# Patient Record
Sex: MALE | Race: WHITE | NOT HISPANIC OR LATINO | Employment: OTHER | URBAN - METROPOLITAN AREA
[De-identification: names, ages, dates, MRNs, and addresses within clinical notes are randomized per-mention and may not be internally consistent; named-entity substitution may affect disease eponyms.]

---

## 2017-04-20 ENCOUNTER — OFFICE VISIT (OUTPATIENT)
Dept: NEUROSURGERY | Facility: CLINIC | Age: 67
End: 2017-04-20

## 2017-04-20 VITALS
BODY MASS INDEX: 36.48 KG/M2 | DIASTOLIC BLOOD PRESSURE: 85 MMHG | SYSTOLIC BLOOD PRESSURE: 145 MMHG | HEIGHT: 77 IN | TEMPERATURE: 96.6 F | WEIGHT: 309 LBS

## 2017-04-20 DIAGNOSIS — M53.9 MULTILEVEL DEGENERATIVE DISC DISEASE: Primary | ICD-10-CM

## 2017-04-20 PROCEDURE — 99203 OFFICE O/P NEW LOW 30 MIN: CPT | Performed by: NEUROLOGICAL SURGERY

## 2017-04-20 RX ORDER — UBIDECARENONE 75 MG
50 CAPSULE ORAL DAILY
COMMUNITY

## 2017-04-20 RX ORDER — UBIDECARENONE 100 MG
100 CAPSULE ORAL DAILY
COMMUNITY

## 2017-04-20 RX ORDER — OMEPRAZOLE 20 MG/1
20 CAPSULE, DELAYED RELEASE ORAL DAILY
COMMUNITY

## 2017-04-20 RX ORDER — LISINOPRIL 40 MG/1
40 TABLET ORAL DAILY
COMMUNITY

## 2017-04-20 RX ORDER — METHOCARBAMOL 750 MG/1
750 TABLET, FILM COATED ORAL NIGHTLY
Qty: 30 TABLET | Refills: 0 | Status: SHIPPED | OUTPATIENT
Start: 2017-04-20 | End: 2017-05-20

## 2017-04-20 RX ORDER — ASPIRIN 81 MG/1
81 TABLET, CHEWABLE ORAL DAILY
COMMUNITY

## 2017-04-20 RX ORDER — NABUMETONE 750 MG/1
750 TABLET, FILM COATED ORAL 2 TIMES DAILY
Qty: 60 TABLET | Refills: 0 | Status: SHIPPED | OUTPATIENT
Start: 2017-04-20

## 2017-04-20 RX ORDER — ATENOLOL AND CHLORTHALIDONE TABLET 50; 25 MG/1; MG/1
1 TABLET ORAL DAILY
COMMUNITY

## 2017-04-20 RX ORDER — ATORVASTATIN CALCIUM 80 MG/1
80 TABLET, FILM COATED ORAL DAILY
COMMUNITY

## 2017-04-20 RX ORDER — PREGABALIN 100 MG/1
150 CAPSULE ORAL 3 TIMES DAILY
COMMUNITY

## 2017-04-20 RX ORDER — MULTIVIT-MIN/FA/LYCOPEN/LUTEIN .4-300-25
TABLET ORAL
COMMUNITY

## 2017-04-20 RX ORDER — IBUPROFEN 800 MG/1
800 TABLET ORAL EVERY 6 HOURS PRN
COMMUNITY
End: 2017-04-20 | Stop reason: CLARIF

## 2017-04-20 NOTE — PROGRESS NOTES
Pascual Yao  1950  [id number]      Chief Complaint   Patient presents with   • Back Pain       HISTORY OF PRESENT ILLNESS:  [This is a 67-year-old who presents with chronic pain in the lumbar area which radiates into his lower extremities and ill-defined distribution.  And he is over acting such as mowing for acres of grass he has numbness in his waist and both lower extremities.  He has no bowel or bladder dysfunction.  Sitting and standing and walking to [place] exacerbate his symptoms.  The predominance of the neurological symptoms are axial.  He does have some claudication, however.    He is been to chiropractic which has not helped.  Physical therapy and provided a very little benefit.  He has had epidural steroid injections which on occasion are of benefit.  He has no bowel or bladder dysfunction.]    Past Medical History:   Diagnosis Date   • Diabetes mellitus    • Hypertension    • Low back pain        Past Surgical History:   Procedure Laterality Date   • SKIN CANCER EXCISION      basal cell skin cancer on the back.       Family History   Problem Relation Age of Onset   • Cancer Mother    • Cancer Father    • Cancer Brother        Social History     Social History   • Marital status:      Spouse name: N/A   • Number of children: N/A   • Years of education: N/A     Occupational History   • Not on file.     Social History Main Topics   • Smoking status: Current Every Day Smoker   • Smokeless tobacco: Not on file   • Alcohol use No   • Drug use: No   • Sexual activity: Defer     Other Topics Concern   • Not on file     Social History Narrative   • No narrative on file       No Known Allergies      Current Outpatient Prescriptions:   •  aspirin 81 MG chewable tablet, Chew 81 mg Daily., Disp: , Rfl:   •  atenolol-chlorthalidone (TENORETIC) 50-25 MG per tablet, Take 1 tablet by mouth Daily., Disp: , Rfl:   •  atorvastatin (LIPITOR) 80 MG tablet, Take 80 mg by mouth Daily., Disp: , Rfl:   •   coenzyme Q10 100 MG capsule, Take 100 mg by mouth Daily., Disp: , Rfl:   •  ibuprofen (ADVIL,MOTRIN) 800 MG tablet, Take 800 mg by mouth Every 6 (Six) Hours As Needed for Mild Pain (1-3)., Disp: , Rfl:   •  lisinopril (PRINIVIL,ZESTRIL) 40 MG tablet, Take 40 mg by mouth Daily., Disp: , Rfl:   •  metFORMIN (GLUCOPHAGE) 1000 MG tablet, Take 1,000 mg by mouth 2 (Two) Times a Day With Meals., Disp: , Rfl:   •  Multiple Vitamins-Minerals (CENTRUM SILVER ADULT 50+) tablet, Take  by mouth., Disp: , Rfl:   •  omeprazole (priLOSEC) 20 MG capsule, Take 20 mg by mouth Daily., Disp: , Rfl:   •  pregabalin (LYRICA) 100 MG capsule, Take 150 mg by mouth 2 (Two) Times a Day., Disp: , Rfl:   •  SITagliptin (JANUVIA) 100 MG tablet, Take 100 mg by mouth Daily., Disp: , Rfl:   •  TRAMADOL HCL PO, Take 50 mg by mouth., Disp: , Rfl:   •  vitamin B-12 (CYANOCOBALAMIN) 100 MCG tablet, Take 50 mcg by mouth Daily., Disp: , Rfl:     Review of Systems   Constitutional: Negative for activity change, appetite change, chills, diaphoresis, fatigue, fever and unexpected weight change.   HENT: Positive for postnasal drip and tinnitus. Negative for congestion, dental problem, drooling, ear discharge, ear pain, facial swelling, hearing loss, mouth sores, nosebleeds, rhinorrhea, sinus pressure, sneezing, sore throat, trouble swallowing and voice change.    Eyes: Negative for photophobia, pain, discharge, redness, itching and visual disturbance.   Respiratory: Negative for apnea, cough, choking, chest tightness, shortness of breath, wheezing and stridor.    Cardiovascular: Negative for chest pain, palpitations and leg swelling.   Gastrointestinal: Negative for abdominal distention, abdominal pain, anal bleeding, blood in stool, constipation, diarrhea, nausea, rectal pain and vomiting.   Endocrine: Negative for cold intolerance, heat intolerance, polydipsia, polyphagia and polyuria.   Genitourinary: Negative for decreased urine volume, difficulty  "urinating, dysuria, enuresis, flank pain, frequency, genital sores, hematuria and urgency.   Musculoskeletal: Positive for back pain. Negative for arthralgias, gait problem, joint swelling, myalgias, neck pain and neck stiffness.   Skin: Negative for color change, pallor, rash and wound.   Allergic/Immunologic: Negative for environmental allergies, food allergies and immunocompromised state.   Neurological: Negative for dizziness, tremors, seizures, syncope, facial asymmetry, speech difficulty, weakness, light-headedness, numbness and headaches.   Hematological: Negative for adenopathy. Does not bruise/bleed easily.   Psychiatric/Behavioral: Negative for agitation, behavioral problems, confusion, decreased concentration, dysphoric mood, hallucinations, self-injury, sleep disturbance and suicidal ideas. The patient is not nervous/anxious and is not hyperactive.    All other systems reviewed and are negative.      Vitals:    04/20/17 1220   BP: 145/85   BP Location: Right arm   Temp: 96.6 °F (35.9 °C)   TempSrc: Temporal Artery    Weight: (!) 309 lb (140 kg)   Height: 77\" (195.6 cm)       Neurological Examination:  Mental status/speech: The patient is alert and oriented.  Speech is clear without aphysia or dysarthria.  No overt cognitive deficits.    Cranial nerve examination:    Olfaction: Smell is intact.  Vision: Vision is intact without visual field abnormalities.  Funduscopic examination is normal.  No pupillary irregularity.  Ocular motor examination: The extraocular muscles are intact.  There is no diplopia.  The pupil is round and reactive to both light and accommodation.  There is no nystagmus.  Facial movement/sensation: There is no facial weakness.  Sensation is intact in the first, second, and third divisions of the trigeminal nerve.  The corneal reflex is intact.  Auditory: Hearing is intact to finger rub bilaterally.  Cranial nerves IX, X, XI, XII: Phonation is normal.  No dysphagia.  Tongue is protruded " in the midline without atrophy.  The gag reflex is intact.  Shoulder shrug is normal.    Musculoligamentous ligamentous examination: He is a large gentleman with decreased range of motion.  Straight leg raising [place] and flip test are negative.  He does have pain in the right hip with internal rotation however.  I'm unable to find evidence of weakness atrophy or drift.  Sensation is intact to touch.  The deep tendon reflexes are hypoactive yet still elicitable.  There is no Babinski Aaron or clonus.      Medical Decision Making:     Diagnostic Data Set:  Lumbar MRI shows the presence of multilevel degenerative disc disease.  He has mild spinal stenosis at L4-L5.  There is no evidence of spondylolisthesis.  No evidence of disc protrusion although he does have bulge of the intervertebral disc at multiple levels it does not compromise of the canal or neuroforamen significantly.      Assessment:  Degenerative disc disease the lumbar spine          Recommendations:  Will intervention is not appropriate at this time.  He has mild spinal stenosis at L4-L5.  However he does not have the symptoms that would necessitate surgical intervention.  I've given a prescription of Relafen 750 mg twice a day and Robaxin 750 mg at night for 1 month.  He will discontinue his ibuprofen 800 mg 3 times a day.    He has diabetes therefore the prolonged use of NSAIDs can be done only with caution.  I am concerned or at least raise the possibility may be having vascular claudication.  I've discussed this with him and will convey this to his primary care physician.  He may require [place] for documentation.    The consideration of a dorsal column stimulator is not without some substantiation.  He is seeing Dr. Zaid Jimenez and I would defer to him for that.  Unfortunately little else I have to offer him and therefore we'll discontinue seeing him.  I do appreciate seeing him.        I greatly appreciate the opportunity to see and evaluate  this individual.  If you have questions or concerns regarding issues that I may have overlooked please call me at any time: [phone].  Bernardino Myrick M.D.  [facility]  [address] [place] [address]    Scribed for Naif Myrick MD by Zachery King CMA. 4/20/2017  12:57 PM     I have read and concur with the information provided by the scribe.  Naif Myrick MD